# Patient Record
Sex: FEMALE | Race: WHITE | NOT HISPANIC OR LATINO | Employment: FULL TIME | URBAN - METROPOLITAN AREA
[De-identification: names, ages, dates, MRNs, and addresses within clinical notes are randomized per-mention and may not be internally consistent; named-entity substitution may affect disease eponyms.]

---

## 2024-12-30 ENCOUNTER — OFFICE VISIT (OUTPATIENT)
Dept: URGENT CARE | Age: 74
End: 2024-12-30
Payer: MEDICARE

## 2024-12-30 VITALS
TEMPERATURE: 98.5 F | DIASTOLIC BLOOD PRESSURE: 73 MMHG | RESPIRATION RATE: 18 BRPM | WEIGHT: 161 LBS | HEART RATE: 81 BPM | SYSTOLIC BLOOD PRESSURE: 110 MMHG | BODY MASS INDEX: 32.46 KG/M2 | OXYGEN SATURATION: 98 % | HEIGHT: 59 IN

## 2024-12-30 DIAGNOSIS — J40 BRONCHITIS: ICD-10-CM

## 2024-12-30 DIAGNOSIS — R09.81 CONGESTION OF NASAL SINUS: Primary | ICD-10-CM

## 2024-12-30 LAB
POC RAPID INFLUENZA A: NEGATIVE
POC RAPID INFLUENZA B: NEGATIVE
POC RAPID STREP: NEGATIVE
POC SARS-COV-2 AG BINAX: NORMAL

## 2024-12-30 PROCEDURE — 99070 SPECIAL SUPPLIES PHYS/QHP: CPT | Performed by: PHYSICIAN ASSISTANT

## 2024-12-30 PROCEDURE — 99204 OFFICE O/P NEW MOD 45 MIN: CPT | Performed by: FAMILY MEDICINE

## 2024-12-30 PROCEDURE — 87880 STREP A ASSAY W/OPTIC: CPT | Performed by: PHYSICIAN ASSISTANT

## 2024-12-30 PROCEDURE — 87804 INFLUENZA ASSAY W/OPTIC: CPT | Performed by: PHYSICIAN ASSISTANT

## 2024-12-30 PROCEDURE — 87811 SARS-COV-2 COVID19 W/OPTIC: CPT | Performed by: PHYSICIAN ASSISTANT

## 2024-12-30 PROCEDURE — 1159F MED LIST DOCD IN RCRD: CPT | Performed by: FAMILY MEDICINE

## 2024-12-30 RX ORDER — BENZONATATE 100 MG/1
100 CAPSULE ORAL 3 TIMES DAILY PRN
Qty: 21 CAPSULE | Refills: 0 | Status: SHIPPED | OUTPATIENT
Start: 2024-12-30 | End: 2024-12-30

## 2024-12-30 RX ORDER — AMOXICILLIN AND CLAVULANATE POTASSIUM 875; 125 MG/1; MG/1
1 TABLET, FILM COATED ORAL 2 TIMES DAILY
Qty: 20 TABLET | Refills: 0 | Status: SHIPPED | OUTPATIENT
Start: 2024-12-30 | End: 2025-01-09

## 2024-12-30 RX ORDER — BENZONATATE 100 MG/1
100 CAPSULE ORAL 3 TIMES DAILY PRN
Qty: 21 CAPSULE | Refills: 0 | Status: SHIPPED | OUTPATIENT
Start: 2024-12-30 | End: 2025-01-06

## 2024-12-30 NOTE — PATIENT INSTRUCTIONS
Your rapid flu panel was negative for influenza A, B, COVID-19.    You had x-rays taken. Your x-ray reading is an initial interpretation. It will be further reviewed by a radiologist. If further treatment is necessary, you will be notified by the urgent care.    You will be started on antibiotic which will be sent to the pharmacy; please complete the regimen as directed even if your symptoms improve. It is recommended to take an Probiotic while on this medication to reduce symptoms of upset stomach, diarrhea    Continue using medication such as Mucinex.    Use your albuterol inhaler as needed for cough and chest congestion

## 2024-12-30 NOTE — PROGRESS NOTES
"Subjective   Patient ID: Zara Dowell is a 74 y.o. female. They present today with a chief complaint of Sore Throat, Nasal Congestion, and Cough.    Patient disposition: Home    History of Present Illness  HPI  Sore throat, cough, chest congestion for couple days.  History of COPD.  Does not take any medication for her COPD.  No fever or chills.  Chest pain when taking deep breath, mostly on the left posterior.  No sick contacts.  Cough is slightly productive.  Patient with baseline exertional dyspnea, usually when going up the steps.  No change in shortness of breath.  No GI symptoms.  Pressure in the ears, mostly on the left.      Past Medical History  Allergies as of 12/30/2024    (Not on File)       (Not in a hospital admission)       No current outpatient medications on file.     No current facility-administered medications for this visit.       There is no problem list on file for this patient.      No past surgical history on file.         Review of Systems  As noted in HPI. ROS otherwise negative unless noted.       Objective    Vitals:    12/30/24 1241   BP: 110/73   BP Location: Right arm   Patient Position: Sitting   BP Cuff Size: Large adult   Pulse: 81   Resp: 18   Temp: 36.9 °C (98.5 °F)   TempSrc: Oral   SpO2: 98%   Weight: 73 kg (161 lb)   Height: 1.499 m (4' 11\")     No LMP recorded. Patient is postmenopausal.    Physical Exam  Constitutional: vital signs reviewed. Well developed, well nourished. patient alert and patient without distress.   Head and Face: Normal and atraumatic.    Ears, Nose, Mouth, and Throat:   Hearing: Normal.  External inspection of nose: Normal.   Lips, teeth, tongue and gums: Normal and well hydrated. External inspection of ears: Normal. Ear canals and TMs: Normal.  Posterior pharynx moist, no exudate, symmetric, no abscess, and with post nasal drip.  Nasal mucosa mildly congested  Neck: No neck mass was observed. Supple. normal muscle tone.   Cardiovascular: Heart rate " normal, normal S1 and S2, no gallops, no murmurs and no pericardial rub. Rhythm: Normal.  Pulmonary: No respiratory distress. Palpation of chest: Normal.  Coarse breath sounds bilateral bases, mostly on the left lower.  Lymphatic: No cervical lymphadenopathy  Psych: Normal mood and affect        Procedures    Point of Care Test & Imaging Results from this visit  Results for orders placed or performed in visit on 12/30/24   POCT Influenza A/B manually resulted    Collection Time: 12/30/24  1:01 PM   Result Value Ref Range    POC Rapid Influenza A Negative Negative    POC Rapid Influenza B Negative Negative   POCT rapid strep A manually resulted    Collection Time: 12/30/24  1:01 PM   Result Value Ref Range    POC Rapid Strep Negative Negative   POCT Covid-19 Rapid Antigen    Collection Time: 12/30/24  1:02 PM   Result Value Ref Range    POC LILIAM-COV-2 AG  Presumptive negative test for SARS-CoV-2 (no antigen detected)     Presumptive negative test for SARS-CoV-2 (no antigen detected)            Diagnostic study results (if any) were reviewed.    Assessment/Plan   Allergies, medications, history, and pertinent labs/EKGs/Imaging reviewed.    Medical Decision Making  See note    Orders and Diagnoses  Diagnoses and all orders for this visit:  Congestion of nasal sinus  -     POCT Covid-19 Rapid Antigen  -     POCT Influenza A/B manually resulted  -     POCT rapid strep A manually resulted  Bronchitis  -     XR chest 2 views; Future      Medical Admin Record      Follow Up Instructions  No follow-ups on file.    At time of discharge patient was clinically well-appearing and HDS for outpatient management. The patient and/or family was educated regarding diagnosis, supportive care, OTC and Rx medications. The patient and/or family was given the opportunity to ask questions prior to discharge and all questions answered. They verbalized understanding of my discussion of the plans for treatment, expected course, indications to  return to UC or seek further evaluation in ED, and the need for timely follow up as directed.      Electronically signed by [facility]

## 2024-12-30 NOTE — ADDENDUM NOTE
Addended by: JANAY FRANKLIN on: 12/30/2024 06:04 PM     Modules accepted: Orders     Note Text (......Xxx Chief Complaint.): This diagnosis correlates with the Detail Level: Zone Other (Free Text): Discussed triggers including weight gain, smoking and tight clothing.

## 2025-06-13 ENCOUNTER — APPOINTMENT (OUTPATIENT)
Dept: CARDIOLOGY | Facility: HOSPITAL | Age: 75
End: 2025-06-13
Payer: MEDICARE

## 2025-06-13 ENCOUNTER — APPOINTMENT (OUTPATIENT)
Dept: RADIOLOGY | Facility: HOSPITAL | Age: 75
End: 2025-06-13
Payer: MEDICARE

## 2025-06-13 ENCOUNTER — HOSPITAL ENCOUNTER (EMERGENCY)
Facility: HOSPITAL | Age: 75
Discharge: HOME | End: 2025-06-13
Attending: EMERGENCY MEDICINE
Payer: MEDICARE

## 2025-06-13 VITALS
BODY MASS INDEX: 32.66 KG/M2 | DIASTOLIC BLOOD PRESSURE: 72 MMHG | OXYGEN SATURATION: 100 % | RESPIRATION RATE: 18 BRPM | SYSTOLIC BLOOD PRESSURE: 144 MMHG | WEIGHT: 162 LBS | HEART RATE: 65 BPM | HEIGHT: 59 IN | TEMPERATURE: 98.2 F

## 2025-06-13 DIAGNOSIS — R07.9 CHEST PAIN, UNSPECIFIED TYPE: Primary | ICD-10-CM

## 2025-06-13 DIAGNOSIS — R06.02 SOB (SHORTNESS OF BREATH): ICD-10-CM

## 2025-06-13 DIAGNOSIS — R10.13 EPIGASTRIC PAIN: ICD-10-CM

## 2025-06-13 LAB
ALBUMIN SERPL BCP-MCNC: 4.1 G/DL (ref 3.4–5)
ALP SERPL-CCNC: 60 U/L (ref 33–136)
ALT SERPL W P-5'-P-CCNC: 20 U/L (ref 7–45)
ANION GAP SERPL CALC-SCNC: 13 MMOL/L (ref 10–20)
AST SERPL W P-5'-P-CCNC: 25 U/L (ref 9–39)
BASOPHILS # BLD AUTO: 0.08 X10*3/UL (ref 0–0.1)
BASOPHILS NFR BLD AUTO: 1.1 %
BILIRUB SERPL-MCNC: 0.6 MG/DL (ref 0–1.2)
BUN SERPL-MCNC: 17 MG/DL (ref 6–23)
CALCIUM SERPL-MCNC: 9.4 MG/DL (ref 8.6–10.3)
CARDIAC TROPONIN I PNL SERPL HS: 10 NG/L (ref 0–13)
CARDIAC TROPONIN I PNL SERPL HS: 9 NG/L (ref 0–13)
CHLORIDE SERPL-SCNC: 105 MMOL/L (ref 98–107)
CO2 SERPL-SCNC: 25 MMOL/L (ref 21–32)
CREAT SERPL-MCNC: 0.98 MG/DL (ref 0.5–1.05)
D DIMER PPP FEU-MCNC: 6240 NG/ML FEU
EGFRCR SERPLBLD CKD-EPI 2021: 60 ML/MIN/1.73M*2
EOSINOPHIL # BLD AUTO: 0.28 X10*3/UL (ref 0–0.4)
EOSINOPHIL NFR BLD AUTO: 3.9 %
ERYTHROCYTE [DISTWIDTH] IN BLOOD BY AUTOMATED COUNT: 16.2 % (ref 11.5–14.5)
GLUCOSE SERPL-MCNC: 91 MG/DL (ref 74–99)
HCT VFR BLD AUTO: 38.8 % (ref 36–46)
HGB BLD-MCNC: 12.4 G/DL (ref 12–16)
IMM GRANULOCYTES # BLD AUTO: 0.02 X10*3/UL (ref 0–0.5)
IMM GRANULOCYTES NFR BLD AUTO: 0.3 % (ref 0–0.9)
INR PPP: 1 (ref 0.9–1.1)
LIPASE SERPL-CCNC: 39 U/L (ref 9–82)
LYMPHOCYTES # BLD AUTO: 2.44 X10*3/UL (ref 0.8–3)
LYMPHOCYTES NFR BLD AUTO: 34 %
MAGNESIUM SERPL-MCNC: 1.95 MG/DL (ref 1.6–2.4)
MCH RBC QN AUTO: 28.2 PG (ref 26–34)
MCHC RBC AUTO-ENTMCNC: 32 G/DL (ref 32–36)
MCV RBC AUTO: 88 FL (ref 80–100)
MONOCYTES # BLD AUTO: 0.58 X10*3/UL (ref 0.05–0.8)
MONOCYTES NFR BLD AUTO: 8.1 %
NEUTROPHILS # BLD AUTO: 3.78 X10*3/UL (ref 1.6–5.5)
NEUTROPHILS NFR BLD AUTO: 52.6 %
NRBC BLD-RTO: 0 /100 WBCS (ref 0–0)
PLATELET # BLD AUTO: 242 X10*3/UL (ref 150–450)
POTASSIUM SERPL-SCNC: 4.2 MMOL/L (ref 3.5–5.3)
PROT SERPL-MCNC: 6.9 G/DL (ref 6.4–8.2)
PROTHROMBIN TIME: 11.2 SECONDS (ref 9.8–12.4)
RBC # BLD AUTO: 4.39 X10*6/UL (ref 4–5.2)
SODIUM SERPL-SCNC: 139 MMOL/L (ref 136–145)
WBC # BLD AUTO: 7.2 X10*3/UL (ref 4.4–11.3)

## 2025-06-13 PROCEDURE — 71045 X-RAY EXAM CHEST 1 VIEW: CPT | Performed by: RADIOLOGY

## 2025-06-13 PROCEDURE — 83735 ASSAY OF MAGNESIUM: CPT

## 2025-06-13 PROCEDURE — 80053 COMPREHEN METABOLIC PANEL: CPT | Performed by: EMERGENCY MEDICINE

## 2025-06-13 PROCEDURE — 93005 ELECTROCARDIOGRAM TRACING: CPT

## 2025-06-13 PROCEDURE — 99285 EMERGENCY DEPT VISIT HI MDM: CPT | Performed by: EMERGENCY MEDICINE

## 2025-06-13 PROCEDURE — 71045 X-RAY EXAM CHEST 1 VIEW: CPT

## 2025-06-13 PROCEDURE — 85025 COMPLETE CBC W/AUTO DIFF WBC: CPT | Performed by: EMERGENCY MEDICINE

## 2025-06-13 PROCEDURE — 84484 ASSAY OF TROPONIN QUANT: CPT | Performed by: EMERGENCY MEDICINE

## 2025-06-13 PROCEDURE — 2500000001 HC RX 250 WO HCPCS SELF ADMINISTERED DRUGS (ALT 637 FOR MEDICARE OP): Performed by: EMERGENCY MEDICINE

## 2025-06-13 PROCEDURE — 36415 COLL VENOUS BLD VENIPUNCTURE: CPT | Performed by: EMERGENCY MEDICINE

## 2025-06-13 PROCEDURE — 85610 PROTHROMBIN TIME: CPT | Performed by: EMERGENCY MEDICINE

## 2025-06-13 PROCEDURE — 2500000001 HC RX 250 WO HCPCS SELF ADMINISTERED DRUGS (ALT 637 FOR MEDICARE OP)

## 2025-06-13 PROCEDURE — 99285 EMERGENCY DEPT VISIT HI MDM: CPT | Mod: 25 | Performed by: EMERGENCY MEDICINE

## 2025-06-13 PROCEDURE — 2550000001 HC RX 255 CONTRASTS: Performed by: EMERGENCY MEDICINE

## 2025-06-13 PROCEDURE — 83690 ASSAY OF LIPASE: CPT | Performed by: EMERGENCY MEDICINE

## 2025-06-13 PROCEDURE — 2500000005 HC RX 250 GENERAL PHARMACY W/O HCPCS: Performed by: EMERGENCY MEDICINE

## 2025-06-13 PROCEDURE — 71275 CT ANGIOGRAPHY CHEST: CPT

## 2025-06-13 PROCEDURE — 85379 FIBRIN DEGRADATION QUANT: CPT

## 2025-06-13 PROCEDURE — 71275 CT ANGIOGRAPHY CHEST: CPT | Performed by: RADIOLOGY

## 2025-06-13 PROCEDURE — 36415 COLL VENOUS BLD VENIPUNCTURE: CPT

## 2025-06-13 RX ORDER — ACETAMINOPHEN 325 MG/1
650 TABLET ORAL ONCE
Status: COMPLETED | OUTPATIENT
Start: 2025-06-13 | End: 2025-06-13

## 2025-06-13 RX ORDER — LIDOCAINE HYDROCHLORIDE 20 MG/ML
15 SOLUTION OROPHARYNGEAL ONCE
Status: COMPLETED | OUTPATIENT
Start: 2025-06-13 | End: 2025-06-13

## 2025-06-13 RX ORDER — MORPHINE SULFATE 4 MG/ML
4 INJECTION, SOLUTION INTRAMUSCULAR; INTRAVENOUS ONCE
Status: DISCONTINUED | OUTPATIENT
Start: 2025-06-13 | End: 2025-06-13

## 2025-06-13 RX ORDER — ALUMINUM HYDROXIDE, MAGNESIUM HYDROXIDE, AND SIMETHICONE 1200; 120; 1200 MG/30ML; MG/30ML; MG/30ML
30 SUSPENSION ORAL ONCE
Status: COMPLETED | OUTPATIENT
Start: 2025-06-13 | End: 2025-06-13

## 2025-06-13 RX ORDER — PANTOPRAZOLE SODIUM 40 MG/1
40 TABLET, DELAYED RELEASE ORAL 2 TIMES DAILY
Qty: 20 TABLET | Refills: 0 | Status: SHIPPED | OUTPATIENT
Start: 2025-06-13 | End: 2025-06-23

## 2025-06-13 RX ADMIN — LIDOCAINE HYDROCHLORIDE 15 ML: 20 SOLUTION ORAL at 15:33

## 2025-06-13 RX ADMIN — ACETAMINOPHEN 650 MG: 325 TABLET ORAL at 15:33

## 2025-06-13 RX ADMIN — ALUMINUM HYDROXIDE, MAGNESIUM HYDROXIDE, AND SIMETHICONE 30 ML: 1200; 120; 1200 SUSPENSION ORAL at 15:33

## 2025-06-13 RX ADMIN — IOHEXOL 60 ML: 350 INJECTION, SOLUTION INTRAVENOUS at 18:27

## 2025-06-13 ASSESSMENT — LIFESTYLE VARIABLES
TOTAL SCORE: 0
EVER FELT BAD OR GUILTY ABOUT YOUR DRINKING: NO
EVER HAD A DRINK FIRST THING IN THE MORNING TO STEADY YOUR NERVES TO GET RID OF A HANGOVER: NO
HAVE PEOPLE ANNOYED YOU BY CRITICIZING YOUR DRINKING: NO
HAVE YOU EVER FELT YOU SHOULD CUT DOWN ON YOUR DRINKING: NO

## 2025-06-13 ASSESSMENT — PAIN DESCRIPTION - LOCATION: LOCATION: CHEST

## 2025-06-13 ASSESSMENT — PAIN SCALES - GENERAL
PAINLEVEL_OUTOF10: 0 - NO PAIN
PAINLEVEL_OUTOF10: 0 - NO PAIN
PAINLEVEL_OUTOF10: 1
PAINLEVEL_OUTOF10: 1

## 2025-06-13 ASSESSMENT — PAIN - FUNCTIONAL ASSESSMENT
PAIN_FUNCTIONAL_ASSESSMENT: 0-10
PAIN_FUNCTIONAL_ASSESSMENT: 0-10

## 2025-06-13 ASSESSMENT — PAIN DESCRIPTION - PAIN TYPE: TYPE: ACUTE PAIN

## 2025-06-13 NOTE — ED PROVIDER NOTES
EMERGENCY DEPARTMENT ENCOUNTER      Pt Name: Zara Dowell  MRN: [id number]  Birthdate 1950  Date of evaluation: 6/13/2025  Provider: Pascale Baig MD    CHIEF COMPLAINT       Chief Complaint   Patient presents with    Chest Pain     Pt with intermittent chest pain for 3-4 days.      HISTORY OF PRESENT ILLNESS    Zara Dowell is a 75 y.o. year old female who came to the ED for chest pain.  She reports that it has been intermittent and she describes the pain as sharp.  She reports that it radiates to her back.  The patient also reports shortness of breath which gets worse whenever her back pain increases.  She denies fevers or chills, she denies trauma to her back.  She denies abdominal pain, constipation or diarrhea.  She reports that she has been stressed due to her 's recent heart failure diagnosis.    PMH is significant for colitis, COPD, CKD stage III, hypertension  PAST MEDICAL HISTORY   Medical History[1]  CURRENT MEDICATIONS       Discharge Medication List as of 6/13/2025  7:25 PM        SURGICAL HISTORY     Surgical History[2]  ALLERGIES     Patient has no known allergies.  FAMILY HISTORY     Family History[3]  SOCIAL HISTORY     Social History[4]  PHYSICAL EXAM  (up to 7 for level 4, 8 or more for level 5)     ED Triage Vitals [06/13/25 1139]   Temperature Heart Rate Respirations BP   36.8 °C (98.2 °F) 83 16 164/77      Pulse Ox Temp Source Heart Rate Source Patient Position   98 % Temporal Monitor Sitting      BP Location FiO2 (%)     Right arm --       Physical Exam  Constitutional:       Appearance: She is normal weight.   HENT:      Head: Normocephalic and atraumatic.   Cardiovascular:      Rate and Rhythm: Normal rate and regular rhythm.      Heart sounds: Normal heart sounds.   Pulmonary:      Effort: Pulmonary effort is normal. No accessory muscle usage or respiratory distress.   Abdominal:      General: Bowel sounds are normal.      Palpations: Abdomen is soft. There is no hepatomegaly,  splenomegaly or mass.      Tenderness: There is no abdominal tenderness.   Musculoskeletal:         General: Normal range of motion.      Cervical back: Normal range of motion and neck supple.      Right lower leg: No tenderness. No edema.      Left lower leg: No tenderness. No edema.   Skin:     General: Skin is warm.   Neurological:      Mental Status: She is alert.        DIAGNOSTIC RESULTS   LABS:  Labs Reviewed   CBC WITH AUTO DIFFERENTIAL - Abnormal       Result Value    WBC 7.2      nRBC 0.0      RBC 4.39      Hemoglobin 12.4      Hematocrit 38.8      MCV 88      MCH 28.2      MCHC 32.0      RDW 16.2 (*)     Platelets 242      Neutrophils % 52.6      Immature Granulocytes %, Automated 0.3      Lymphocytes % 34.0      Monocytes % 8.1      Eosinophils % 3.9      Basophils % 1.1      Neutrophils Absolute 3.78      Immature Granulocytes Absolute, Automated 0.02      Lymphocytes Absolute 2.44      Monocytes Absolute 0.58      Eosinophils Absolute 0.28      Basophils Absolute 0.08     COMPREHENSIVE METABOLIC PANEL - Abnormal    Glucose 91      Sodium 139      Potassium 4.2      Chloride 105      Bicarbonate 25      Anion Gap 13      Urea Nitrogen 17      Creatinine 0.98      eGFR 60 (*)     Calcium 9.4      Albumin 4.1      Alkaline Phosphatase 60      Total Protein 6.9      AST 25      Bilirubin, Total 0.6      ALT 20     D-DIMER, NON VTE - Abnormal    D-Dimer Non VTE, Quant (ng/mL FEU) 6,240 (*)     Narrative:     The D-Dimer assay is reported in ng/mL Fibrinogen Equivalent Units (FEU). The results of this assay should NOT be used for the exclusion of Deep Vein Thrombosis and/or Pulmonary Embolism.   PROTIME-INR - Normal    Protime 11.2      INR 1.0     SERIAL TROPONIN-INITIAL - Normal    Troponin I, High Sensitivity 9      Narrative:     Less than 99th percentile of normal range cutoff-  Female and children under 18 years old <14 ng/L; Male <21 ng/L: Negative  Repeat testing should be performed if clinically  indicated.     Female and children under 18 years old 14-50 ng/L; Male 21-50 ng/L:  Consistent with possible cardiac damage and possible increased clinical   risk. Serial measurements may help to assess extent of myocardial damage.     >50 ng/L: Consistent with cardiac damage, increased clinical risk and  myocardial infarction. Serial measurements may help assess extent of   myocardial damage.      NOTE: Children less than 1 year old may have higher baseline troponin   levels and results should be interpreted in conjunction with the overall   clinical context.     NOTE: Troponin I testing is performed using a different   testing methodology at [facility] than at other   [facility]. Direct result comparisons should only   be made within the same method.   SERIAL TROPONIN, 1 HOUR - Normal    Troponin I, High Sensitivity 10      Narrative:     Less than 99th percentile of normal range cutoff-  Female and children under 18 years old <14 ng/L; Male <21 ng/L: Negative  Repeat testing should be performed if clinically indicated.     Female and children under 18 years old 14-50 ng/L; Male 21-50 ng/L:  Consistent with possible cardiac damage and possible increased clinical   risk. Serial measurements may help to assess extent of myocardial damage.     >50 ng/L: Consistent with cardiac damage, increased clinical risk and  myocardial infarction. Serial measurements may help assess extent of   myocardial damage.      NOTE: Children less than 1 year old may have higher baseline troponin   levels and results should be interpreted in conjunction with the overall   clinical context.     NOTE: Troponin I testing is performed using a different   testing methodology at [facility] than at other   [facility]. Direct result comparisons should only   be made within the same method.   MAGNESIUM - Normal    Magnesium 1.95     LIPASE - Normal    Lipase 39      Narrative:     Venipuncture immediately  after or during the administration of Metamizole may lead to falsely low results. Testing should be performed immediately prior to Metamizole dosing.   TROPONIN SERIES- (INITIAL, 1 HR)    Narrative:     The following orders were created for panel order Troponin I Series, High Sensitivity (0, 1 HR).  Procedure                               Abnormality         Status                     ---------                               -----------         ------                     Troponin I, High Sensiti...[[id number]]  Normal              Final result               Troponin, High Sensitivi...[[id number]]  Normal              Final result                 Please view results for these tests on the individual orders.     All other labs were within normal range or not returned as of this dictation.  Imaging  CT angio chest for pulmonary embolism   Final Result   No evidence of acute pulmonary embolism.        No evidence of pneumonia.        MACRO:   None        Signed by: Jovany Knox 6/13/2025 6:54 PM   Dictation workstation:   [id number]      XR chest 1 view   Final Result   No focal infiltrate or pneumothorax is identified.        MACRO:   None.        Signed by: Jose Cabrera 6/13/2025 3:09 PM   Dictation workstation:   [id number]         Procedure  Procedures  EMERGENCY DEPARTMENT COURSE/MDM:   Medical Decision Making    Vitals:    Vitals:    06/13/25 1424 06/13/25 1530 06/13/25 1630 06/13/25 1925   BP: 153/75 137/77 132/76 144/72   BP Location: Right arm Right arm Right arm    Patient Position: Sitting Sitting Sitting    Pulse: 52 68 65    Resp: 18 18 18    Temp:       TempSrc:       SpO2: 98% 99% 100%    Weight:       Height:         Zara Dowell is a female 75 y.o. who presents to the ER for chest pain. On arrival the patients vital signs were: Afebrile, regular heart rate, normotensive, regular respiration rate, normoxic on room air. History obtained from: patient.  The differential diagnoses includes MI, gastritis,  colitis, pancreatitis, gastroenteritis, PE.    Physical exam significant for tenderness to the epigastric region.  There is no voluntary guarding on examination.  Bowel sounds are within normal limits. The heart is in regular rate and rhythm, no murmurs appreciated on auscultation. The lungs are clear to auscultation, no rales, crackles, wheezing present.   The skin is normal, no rashes or lesions.  There is no injury or edema in the lower extremities.    On independent assessment of the labs, CBC was within normal limits, no evidence of infection or acute anemia.  CMP was within normal limits, no evidence of electrolyte abnormalities, ANNA, or liver dysfunction.  Dimer was elevated at 6240.    X-ray of the chest did not show any acute cardiopulmonary processes.  CTA was negative for any pulmonary embolism.    Patient was given a GI cocktail for her discomfort, on reassessment, the patient feels better. The patient has remained stable throughout the entire ED visit and is without objective evidence or laboratory findings for acute process requiring emergent intervention or hospitalization. The patient and/or family had all the tests and diagnosis explained to them and were given both verbal and written discharge instructions. I answered the patient's question as well as family to the best of my ability about the patient's symptoms. The patient is stable for discharge. The patient was discharged with a prescription of omeprazole and given return precautions. The patient was instructed to follow up with the PCP in one week. The patient understood and was agreeable with the plan.           ED Course as of 06/13/25 2109 Fri Jun 13, 2025   1650 Patient was seen and examined.  75-year-old female presenting to the emergency department with intermittent chest pain for past couple of days.  She states that last night it was pretty bad which is what prompted her to come to the emergency department today.  She states it  typically last about 5 minutes and then subsides.  She gets a fullness in the throat and a burning sensation.  She has associated nausea without vomiting.    On exam patient is afebrile and hemodynamically stable.  She is in no acute distress.  She not having any active chest pain currently.  No conjunctival pallor is present.  No JVD or carotid bruit.  Heart regular rate and rhythm with no murmurs.  Lungs are clear to auscultation.  Abdomen is soft and nontender.  No swelling in the legs, no tenderness along the deep venous system.    IV established and cardiac workup was initiated.  Labs all reviewed.  2 sets of negative cardiac enzymes.  Lipase normal.  D-dimer is elevated at 6240.  CTA of the chest was ordered.  Her chest x-ray is negative for pneumonia, pneumothorax wide mediastinum or any other acute cardiopulmonary abnormality. [JJ]   1904 CTA negative for PE pneumonia or any other acute cardiopulmonary abnormality.  GI cocktail did help the patient's symptoms.  I think they are most consistent with gastritis.  Will start the patient on Protonix and have her follow-up with her primary care doctor.  She is given strict return precautions.  She understands agrees to the plan [JJ]      ED Course User Index  [JJ] Zaheer Rincon DO         Diagnoses as of 06/13/25 2109   Chest pain, unspecified type   SOB (shortness of breath)   Epigastric pain             External Records Reviewed: I reviewed recent and relevant outside records including inpatient notes, outpatient records      Shared decision making for disposition  Patient and/or patient´s representative was counseled regarding labs, imaging, likely diagnosis. All questions were answered. Recommendation was made   for discharge home. The patient agreed and was discharged home in stable condition with appropriate relevant educational materials. Return precautions were provided which included new or worsening chest pain, shortness of breath, fever of 38C (100.4)  or higher, persistent vomiting, weakness, numbness, tingling, excessive sweating,  loss of motion in your arms or legs, fainting, vision changes, or any new or worsening symptoms..     ED Medications administered this visit:    Medications   acetaminophen (Tylenol) tablet 650 mg (650 mg oral Given 6/13/25 1533)   alum-mag hydroxide-simeth (Mylanta) 200-200-20 mg/5 mL oral suspension 30 mL (30 mL oral Given 6/13/25 1533)   lidocaine (Xylocaine) 2 % mouth solution 15 mL (15 mL oral Given 6/13/25 1533)   iohexol (OMNIPaque) 350 mg iodine/mL solution 60 mL (60 mL intravenous Given 6/13/25 9767)       New Prescriptions from this visit:    Discharge Medication List as of 6/13/2025  7:25 PM        START taking these medications    Details   pantoprazole (ProtoNix) 40 mg EC tablet Take 1 tablet (40 mg) by mouth 2 times a day for 10 days. Do not crush, chew, or split., Starting Fri 6/13/2025, Until Mon 6/23/2025, Normal             Follow-up:  STEVO Jones MD  [id number] [place] [address] [address]  [place] [id number] [phone]    In 1 week          Final Impression:   1. Chest pain, unspecified type    2. SOB (shortness of breath)    3. Epigastric pain          Please excuse any misspellings or unintended errors related to the Dragon speech recognition software used to dictate this note.    I reviewed the case with the attending ED physician. The attending ED physician agrees with the plan.          [1] History reviewed. No pertinent past medical history.  [2] History reviewed. No pertinent surgical history.  [3] No family history on file.  [4]   Social History  Tobacco Use    Smoking status: Not on file    Smokeless tobacco: Not on file   Substance Use Topics    Alcohol use: Not on file    Drug use: Not on file        Pascale Baig MD  Resident  06/13/25 [id number]

## 2025-06-13 NOTE — DISCHARGE INSTRUCTIONS
You are assessed in the ED for your chest pain.  Your labs were negative for heart attack.  Your CT images are negative for a blood clot in the lungs.  For your pain, you are prescribed Protonix to take twice a day for 10 days.    Please return to the ED if your pain worsens, changes, radiates down your arm, radiates to your back, if you are vomiting from the pain, if you have episodes of syncope, if you are confused, if you cannot breathe, and if you have any other concerns.    Please follow-up with your primary care provider in 1 week to follow the course of your recovery and to see if you need any medication changes.

## 2025-06-15 LAB
ATRIAL RATE: 50 BPM
ATRIAL RATE: 65 BPM
P AXIS: 55 DEGREES
P AXIS: 64 DEGREES
P OFFSET: 174 MS
P OFFSET: 188 MS
P ONSET: 124 MS
P ONSET: 128 MS
PR INTERVAL: 162 MS
PR INTERVAL: 170 MS
Q ONSET: 209 MS
Q ONSET: 209 MS
QRS COUNT: 10 BEATS
QRS COUNT: 8 BEATS
QRS DURATION: 90 MS
QRS DURATION: 90 MS
QT INTERVAL: 412 MS
QT INTERVAL: 460 MS
QTC CALCULATION(BAZETT): 419 MS
QTC CALCULATION(BAZETT): 428 MS
QTC FREDERICIA: 422 MS
QTC FREDERICIA: 433 MS
R AXIS: -12 DEGREES
R AXIS: -15 DEGREES
T AXIS: 18 DEGREES
T AXIS: 3 DEGREES
T OFFSET: 415 MS
T OFFSET: 439 MS
VENTRICULAR RATE: 50 BPM
VENTRICULAR RATE: 65 BPM

## 2025-06-16 LAB
HOLD SPECIMEN: NORMAL
HOLD SPECIMEN: NORMAL

## 2025-08-25 ENCOUNTER — HOSPITAL ENCOUNTER (OUTPATIENT)
Dept: RADIOLOGY | Facility: HOSPITAL | Age: 75
Discharge: HOME | End: 2025-08-25
Payer: MEDICARE

## 2025-08-25 DIAGNOSIS — R07.9 CHEST PAIN, UNSPECIFIED: ICD-10-CM

## 2025-08-25 DIAGNOSIS — R06.02 SHORTNESS OF BREATH: ICD-10-CM

## 2025-08-25 PROCEDURE — 71046 X-RAY EXAM CHEST 2 VIEWS: CPT

## 2025-08-25 PROCEDURE — 71046 X-RAY EXAM CHEST 2 VIEWS: CPT | Performed by: RADIOLOGY
